# Patient Record
Sex: FEMALE | Race: WHITE | NOT HISPANIC OR LATINO | Employment: FULL TIME | URBAN - METROPOLITAN AREA
[De-identification: names, ages, dates, MRNs, and addresses within clinical notes are randomized per-mention and may not be internally consistent; named-entity substitution may affect disease eponyms.]

---

## 2018-08-04 ENCOUNTER — OFFICE VISIT (OUTPATIENT)
Dept: URGENT CARE | Facility: CLINIC | Age: 73
End: 2018-08-04
Payer: MEDICARE

## 2018-08-04 VITALS
TEMPERATURE: 99 F | DIASTOLIC BLOOD PRESSURE: 62 MMHG | SYSTOLIC BLOOD PRESSURE: 136 MMHG | HEART RATE: 79 BPM | HEIGHT: 61 IN | OXYGEN SATURATION: 94 % | RESPIRATION RATE: 20 BRPM | WEIGHT: 172 LBS | BODY MASS INDEX: 32.47 KG/M2

## 2018-08-04 DIAGNOSIS — K52.89 OTHER NONINFECTIOUS GASTROENTERITIS: Primary | ICD-10-CM

## 2018-08-04 PROCEDURE — 99203 OFFICE O/P NEW LOW 30 MIN: CPT

## 2018-08-04 PROCEDURE — G0463 HOSPITAL OUTPT CLINIC VISIT: HCPCS

## 2018-08-04 RX ORDER — MONTELUKAST SODIUM 10 MG/1
10 TABLET ORAL
COMMUNITY

## 2018-08-04 RX ORDER — CHLORTHALIDONE 25 MG/1
25 TABLET ORAL DAILY
COMMUNITY

## 2018-08-04 RX ORDER — FLUTICASONE PROPIONATE 110 UG/1
2 AEROSOL, METERED RESPIRATORY (INHALATION) 2 TIMES DAILY
COMMUNITY

## 2018-08-04 RX ORDER — ONDANSETRON 4 MG/1
4 TABLET, FILM COATED ORAL EVERY 8 HOURS PRN
Qty: 20 TABLET | Refills: 0 | Status: SHIPPED | OUTPATIENT
Start: 2018-08-04

## 2018-08-04 RX ORDER — LOPERAMIDE HYDROCHLORIDE 2 MG/1
2 TABLET ORAL 4 TIMES DAILY PRN
Qty: 30 TABLET | Refills: 0 | Status: SHIPPED | OUTPATIENT
Start: 2018-08-04

## 2018-08-04 RX ORDER — ATORVASTATIN CALCIUM 10 MG/1
10 TABLET, FILM COATED ORAL DAILY
COMMUNITY

## 2018-08-04 RX ORDER — POTASSIUM CHLORIDE 750 MG/1
10 CAPSULE, EXTENDED RELEASE ORAL 2 TIMES DAILY
COMMUNITY

## 2018-08-04 NOTE — PATIENT INSTRUCTIONS
Acute Nausea and Vomiting   WHAT YOU NEED TO KNOW:   What is acute nausea and vomiting? Acute nausea and vomiting starts suddenly, gets worse quickly, and lasts a short time  What are some common causes of acute nausea and vomiting? · Food poisoning    · Large amounts of alcohol    · Certain medicines, too much of any medicine, or stopping a regular medicine too quickly    · Early stages of pregnancy    · Infection in the stomach, intestines, or other organs    · Trauma to the head    · Anxiety or stress    · Gastroparesis (a condition that prevents your stomach from emptying properly)    · Metabolic disorders, such as uremia or adrenal insufficiency  What causes acute nausea and vomiting with stomach pain? · Inflammation of the appendix, gallbladder, stomach, pancreas, kidneys, or other organs    · Gallstones    · Bacteria or a parasite in the digestive system    · Heart attack    · Stomach ulcers, or bowel blockage or twisting  What causes acute nausea and vomiting with other signs and symptoms? You may be sweating and have pale skin, problems with digestion, and more saliva than usual  These signs and symptoms may be caused by the following:  · Problems with your heart rate, blood flow to your heart muscle, blood pressure, or stomach fluid    · Increased pressure or bleeding in the brain    · Swelling of the tissue covering the brain    · Migraine or seizures    · Inner ear disorders that cause problems with balance  How is the cause of acute nausea and vomiting diagnosed? Your healthcare provider will examine you and ask about your medical history  Tell your provider about other signs and symptoms you have  Also tell your provider when you last had nausea and vomiting and how long it continued  Include if it happened before, during, or after a meal, and how much you ate  Your provider will need to know how much came out when you vomited, and if it was fast and forceful   Tell your provider if your vomit smelled like bowel movement or had blood or food in it  Tell your provider if the vomit was bright yellow  You may need any of the following:  · Blood tests  may be used to check for infection or inflammation  · X-ray, CT, or MRI pictures  may be used to find an injury or blockage  How may acute nausea and vomiting be treated? The first goal of treatment for nausea and vomiting is to prevent or treat dehydration  Treatment also depends on the cause of the nausea and vomiting  Any medical condition causing your nausea and vomiting will also be treated  Treatment is also aimed at stopping or preventing your signs and symptoms  You may need one or more of the following:  · Medicines  may be given to calm your stomach and stop your vomiting  You may also need medicines to help you feel more relaxed or to stop nausea and vomiting caused by motion sickness  Gastrointestinal stimulants may be used to help empty your stomach and bowels  This can help decrease your nausea and vomiting  · IV fluids  may be given to replace lost fluids and electrolytes  This may be needed it you cannot drink liquids  · Nasogastric (NG) tube: An NG tube is put into your nose, and passes down your throat until it reaches your stomach  Food and medicine may be given through an NG tube if you cannot take anything by mouth  The tube may instead be attached to suction if caregivers need to keep your stomach empty  What can I do to prevent or manage acute nausea and vomiting? · Do not drink alcohol  Alcohol may upset or irritate your stomach  Too much alcohol can also cause acute nausea and vomiting  · Control stress  Headaches due to stress may cause nausea and vomiting  Find ways to relax and manage your stress  Get more rest and sleep  · Drink more liquids as directed  Vomiting can lead to dehydration  It is important to drink more liquids to help replace lost body fluids   Ask your healthcare provider how much liquid to drink each day and which liquids are best for you  Your provider may recommend that you drink an oral rehydration solution (ORS)  ORS contains water, salts, and sugar that are needed to replace the lost body fluids  Ask what kind of ORS to use, how much to drink, and where to get it  · Eat smaller meals, more often  Eat small amounts of food every 2 to 3 hours, even if you are not hungry  Food in your stomach may decrease your nausea  · Talk to your healthcare provider before you take over-the-counter (OTC) medicines  These medicines can cause serious problems if you use certain other medicines, or you have a medical condition  You may have problems if you use too much or use them for longer than the label says  Follow directions on the label carefully  When should I seek immediate care? · You see blood in your vomit or your bowel movements  · You have sudden, severe pain in your chest and upper abdomen after hard vomiting or retching  · You have swelling in your neck and chest      · You are dizzy, cold, and thirsty, and your eyes and mouth are dry  · You are urinating very little or not at all  · You have muscle weakness, leg cramps, and trouble breathing  · Your heart is beating much faster than normal     · You continue to vomit for more than 48 hours  When should I contact my healthcare provider? · You have frequent dry heaves (vomiting but nothing comes out)  · Your nausea and vomiting does not get better or go away after you use medicine  · You have questions or concerns about your condition or care  CARE AGREEMENT:   You have the right to help plan your care  Learn about your health condition and how it may be treated  Discuss treatment options with your caregivers to decide what care you want to receive  You always have the right to refuse treatment  The above information is an  only   It is not intended as medical advice for individual conditions or treatments  Talk to your doctor, nurse or pharmacist before following any medical regimen to see if it is safe and effective for you  © 2017 [employer] Information is for End User's use only and may not be sold, redistributed or otherwise used for commercial purposes  All illustrations and images included in CareNotes® are the copyrighted property of [employer]  or [employer]

## 2018-08-04 NOTE — PROGRESS NOTES
[facility]        NAME: Jane Espinoza is a 74 y o  female  : 1945    MRN: [id number]  DATE: 2018  TIME: 11:33 AM    Assessment and Plan   Other noninfectious gastroenteritis [K52 89]  1  Other noninfectious gastroenteritis  loperamide (IMODIUM A-D) 2 MG tablet    ondansetron (ZOFRAN) 4 mg tablet         Patient Instructions     Patient Instructions   Acute Nausea and Vomiting   WHAT YOU NEED TO KNOW:   What is acute nausea and vomiting? Acute nausea and vomiting starts suddenly, gets worse quickly, and lasts a short time  What are some common causes of acute nausea and vomiting? · Food poisoning    · Large amounts of alcohol    · Certain medicines, too much of any medicine, or stopping a regular medicine too quickly    · Early stages of pregnancy    · Infection in the stomach, intestines, or other organs    · Trauma to the head    · Anxiety or stress    · Gastroparesis (a condition that prevents your stomach from emptying properly)    · Metabolic disorders, such as uremia or adrenal insufficiency  What causes acute nausea and vomiting with stomach pain? · Inflammation of the appendix, gallbladder, stomach, pancreas, kidneys, or other organs    · Gallstones    · Bacteria or a parasite in the digestive system    · Heart attack    · Stomach ulcers, or bowel blockage or twisting  What causes acute nausea and vomiting with other signs and symptoms? You may be sweating and have pale skin, problems with digestion, and more saliva than usual  These signs and symptoms may be caused by the following:  · Problems with your heart rate, blood flow to your heart muscle, blood pressure, or stomach fluid    · Increased pressure or bleeding in the brain    · Swelling of the tissue covering the brain    · Migraine or seizures    · Inner ear disorders that cause problems with balance  How is the cause of acute nausea and vomiting diagnosed?   Your healthcare provider will examine you and ask about your medical history  Tell your provider about other signs and symptoms you have  Also tell your provider when you last had nausea and vomiting and how long it continued  Include if it happened before, during, or after a meal, and how much you ate  Your provider will need to know how much came out when you vomited, and if it was fast and forceful  Tell your provider if your vomit smelled like bowel movement or had blood or food in it  Tell your provider if the vomit was bright yellow  You may need any of the following:  · Blood tests  may be used to check for infection or inflammation  · X-ray, CT, or MRI pictures  may be used to find an injury or blockage  How may acute nausea and vomiting be treated? The first goal of treatment for nausea and vomiting is to prevent or treat dehydration  Treatment also depends on the cause of the nausea and vomiting  Any medical condition causing your nausea and vomiting will also be treated  Treatment is also aimed at stopping or preventing your signs and symptoms  You may need one or more of the following:  · Medicines  may be given to calm your stomach and stop your vomiting  You may also need medicines to help you feel more relaxed or to stop nausea and vomiting caused by motion sickness  Gastrointestinal stimulants may be used to help empty your stomach and bowels  This can help decrease your nausea and vomiting  · IV fluids  may be given to replace lost fluids and electrolytes  This may be needed it you cannot drink liquids  · Nasogastric (NG) tube: An NG tube is put into your nose, and passes down your throat until it reaches your stomach  Food and medicine may be given through an NG tube if you cannot take anything by mouth  The tube may instead be attached to suction if caregivers need to keep your stomach empty  What can I do to prevent or manage acute nausea and vomiting? · Do not drink alcohol  Alcohol may upset or irritate your stomach   Too much alcohol can also cause acute nausea and vomiting  · Control stress  Headaches due to stress may cause nausea and vomiting  Find ways to relax and manage your stress  Get more rest and sleep  · Drink more liquids as directed  Vomiting can lead to dehydration  It is important to drink more liquids to help replace lost body fluids  Ask your healthcare provider how much liquid to drink each day and which liquids are best for you  Your provider may recommend that you drink an oral rehydration solution (ORS)  ORS contains water, salts, and sugar that are needed to replace the lost body fluids  Ask what kind of ORS to use, how much to drink, and where to get it  · Eat smaller meals, more often  Eat small amounts of food every 2 to 3 hours, even if you are not hungry  Food in your stomach may decrease your nausea  · Talk to your healthcare provider before you take over-the-counter (OTC) medicines  These medicines can cause serious problems if you use certain other medicines, or you have a medical condition  You may have problems if you use too much or use them for longer than the label says  Follow directions on the label carefully  When should I seek immediate care? · You see blood in your vomit or your bowel movements  · You have sudden, severe pain in your chest and upper abdomen after hard vomiting or retching  · You have swelling in your neck and chest      · You are dizzy, cold, and thirsty, and your eyes and mouth are dry  · You are urinating very little or not at all  · You have muscle weakness, leg cramps, and trouble breathing  · Your heart is beating much faster than normal     · You continue to vomit for more than 48 hours  When should I contact my healthcare provider? · You have frequent dry heaves (vomiting but nothing comes out)  · Your nausea and vomiting does not get better or go away after you use medicine      · You have questions or concerns about your condition or care   CARE AGREEMENT:   You have the right to help plan your care  Learn about your health condition and how it may be treated  Discuss treatment options with your caregivers to decide what care you want to receive  You always have the right to refuse treatment  The above information is an  only  It is not intended as medical advice for individual conditions or treatments  Talk to your doctor, nurse or pharmacist before following any medical regimen to see if it is safe and effective for you  © 2017 [employer]  Information is for End User's use only and may not be sold, redistributed or otherwise used for commercial purposes  All illustrations and images included in CareNotes® are the copyrighted property of [employer]  or [employer]  Follow up with PCP in 3-5 days  Proceed to  ER if symptoms worsen  Chief Complaint     Chief Complaint   Patient presents with    Diarrhea     Onset 3 days ago  Nausea  loss of appitite          History of Present Illness       Patient complains of nausea and diarrhea for the past 5 days  No relief with Imodium  Review of Systems   Review of Systems   Constitutional: Negative for chills and fever  HENT: Negative for sinus pressure, sore throat, trouble swallowing and voice change  Respiratory: Negative for cough, chest tightness, shortness of breath and wheezing  Cardiovascular: Negative for chest pain  Gastrointestinal: Positive for diarrhea, nausea and vomiting  Negative for abdominal distention, abdominal pain and blood in stool  Skin: Negative for rash  Neurological: Negative for headaches           Current Medications       Current Outpatient Prescriptions:     atorvastatin (LIPITOR) 10 mg tablet, Take 10 mg by mouth daily, Disp: , Rfl:     chlorthalidone 25 mg tablet, Take 25 mg by mouth daily, Disp: , Rfl:     fluticasone (FLOVENT HFA) 110 MCG/ACT inhaler, Inhale 2 puffs 2 (two) times a day Rinse mouth after use , Disp: , Rfl:     montelukast (SINGULAIR) 10 mg tablet, Take 10 mg by mouth daily at bedtime, Disp: , Rfl:     potassium chloride (MICRO-K) 10 MEQ CR capsule, Take 10 mEq by mouth 2 (two) times a day, Disp: , Rfl:     loperamide (IMODIUM A-D) 2 MG tablet, Take 1 tablet (2 mg total) by mouth 4 (four) times a day as needed for diarrhea, Disp: 30 tablet, Rfl: 0    ondansetron (ZOFRAN) 4 mg tablet, Take 1 tablet (4 mg total) by mouth every 8 (eight) hours as needed for nausea or vomiting, Disp: 20 tablet, Rfl: 0    Current Allergies     Allergies as of 08/04/2018    (No Known Allergies)            The following portions of the patient's history were reviewed and updated as appropriate: allergies, current medications, past family history, past medical history, past social history, past surgical history and problem list      Past Medical History:   Diagnosis Date    Allergic     Hypertension        Past Surgical History:   Procedure Laterality Date    CARPAL TUNNEL RELEASE Bilateral     FOOT SURGERY      HYSTERECTOMY      KNEE ARTHROPLASTY Right        History reviewed  No pertinent family history  Medications have been verified  Objective   /62   Pulse 79   Temp 99 °F (37 2 °C)   Resp 20   Ht 5' 1" (1 549 m)   Wt 78 kg (172 lb)   SpO2 94%   BMI 32 50 kg/m²        Physical Exam     Physical Exam   Constitutional: She is oriented to person, place, and time  She appears well-developed and well-nourished  No distress  HENT:   Head: Normocephalic and atraumatic  Right Ear: Tympanic membrane and external ear normal    Left Ear: Tympanic membrane and external ear normal    Nose: Mucosal edema present  Mouth/Throat: Posterior oropharyngeal erythema present  No oropharyngeal exudate or tonsillar abscesses  Neck: Neck supple  Cardiovascular: Normal rate and regular rhythm  Pulmonary/Chest: Effort normal    Abdominal: Soft   Bowel sounds are normal  She exhibits no distension and no mass  There is no tenderness  There is no rebound and no guarding  Neurological: She is alert and oriented to person, place, and time  Skin: Skin is warm and dry  No rash noted  No pallor  Nursing note and vitals reviewed